# Patient Record
Sex: MALE | Race: WHITE | NOT HISPANIC OR LATINO | Employment: FULL TIME | URBAN - METROPOLITAN AREA
[De-identification: names, ages, dates, MRNs, and addresses within clinical notes are randomized per-mention and may not be internally consistent; named-entity substitution may affect disease eponyms.]

---

## 2017-05-11 DIAGNOSIS — N40.0 BENIGN NON-NODULAR PROSTATIC HYPERPLASIA WITHOUT LOWER URINARY TRACT SYMPTOMS: ICD-10-CM

## 2017-05-11 RX ORDER — FINASTERIDE 5 MG/1
5 TABLET, FILM COATED ORAL DAILY
Qty: 30 TABLET | Refills: 0 | Status: SHIPPED | OUTPATIENT
Start: 2017-05-11 | End: 2017-06-07 | Stop reason: SDUPTHER

## 2017-05-11 NOTE — TELEPHONE ENCOUNTER
----- Message from Osmani Amezquita sent at 5/11/2017  8:31 AM CDT -----  Contact: Louie  CHELY set up an annual appointment for June 9, 2017 @ 9:15 a. He is needing a prescription refill for   Rx finasteride (PROSCAR) 5 mg tablet 30 tablet 11     [id number] - [place], [place] - [address]  [address]  [place] [address]  Phone: [phone] Fax: [phone]    Please call him to let him know if he can get the fill before visit. Please call back [phone]. Thanks!

## 2017-06-07 DIAGNOSIS — N40.0 BENIGN NON-NODULAR PROSTATIC HYPERPLASIA WITHOUT LOWER URINARY TRACT SYMPTOMS: ICD-10-CM

## 2017-06-07 RX ORDER — FINASTERIDE 5 MG/1
TABLET, FILM COATED ORAL
Qty: 30 TABLET | Refills: 0 | Status: SHIPPED | OUTPATIENT
Start: 2017-06-07 | End: 2018-06-28 | Stop reason: SDUPTHER

## 2017-06-09 ENCOUNTER — OFFICE VISIT (OUTPATIENT)
Dept: UROLOGY | Facility: CLINIC | Age: 62
End: 2017-06-09
Payer: COMMERCIAL

## 2017-06-09 ENCOUNTER — LAB VISIT (OUTPATIENT)
Dept: LAB | Facility: HOSPITAL | Age: 62
End: 2017-06-09
Attending: UROLOGY
Payer: COMMERCIAL

## 2017-06-09 VITALS
SYSTOLIC BLOOD PRESSURE: 166 MMHG | DIASTOLIC BLOOD PRESSURE: 99 MMHG | HEIGHT: 70 IN | TEMPERATURE: 99 F | HEART RATE: 87 BPM

## 2017-06-09 DIAGNOSIS — N13.8 BPH WITH OBSTRUCTION/LOWER URINARY TRACT SYMPTOMS: ICD-10-CM

## 2017-06-09 DIAGNOSIS — N13.8 BPH WITH OBSTRUCTION/LOWER URINARY TRACT SYMPTOMS: Primary | ICD-10-CM

## 2017-06-09 DIAGNOSIS — N40.1 BPH WITH OBSTRUCTION/LOWER URINARY TRACT SYMPTOMS: ICD-10-CM

## 2017-06-09 DIAGNOSIS — N40.1 BPH WITH OBSTRUCTION/LOWER URINARY TRACT SYMPTOMS: Primary | ICD-10-CM

## 2017-06-09 LAB
BILIRUB SERPL-MCNC: NORMAL MG/DL
BLOOD URINE, POC: NORMAL
COLOR, POC UA: NORMAL
COMPLEXED PSA SERPL-MCNC: 2.4 NG/ML
GLUCOSE UR QL STRIP: NORMAL
KETONES UR QL STRIP: NORMAL
LEUKOCYTE ESTERASE URINE, POC: NORMAL
NITRITE, POC UA: NORMAL
PH, POC UA: 5
PROTEIN, POC: NORMAL
SPECIFIC GRAVITY, POC UA: 1.02
UROBILINOGEN, POC UA: NORMAL

## 2017-06-09 PROCEDURE — 99999 PR PBB SHADOW E&M-EST. PATIENT-LVL III: CPT | Mod: PBBFAC,,, | Performed by: UROLOGY

## 2017-06-09 PROCEDURE — 99214 OFFICE O/P EST MOD 30 MIN: CPT | Mod: 25,S$GLB,, | Performed by: UROLOGY

## 2017-06-09 PROCEDURE — 81002 URINALYSIS NONAUTO W/O SCOPE: CPT | Mod: S$GLB,,, | Performed by: UROLOGY

## 2017-06-09 PROCEDURE — 84153 ASSAY OF PSA TOTAL: CPT

## 2017-06-09 PROCEDURE — 36415 COLL VENOUS BLD VENIPUNCTURE: CPT

## 2017-06-09 RX ORDER — BIOTIN 5 MG
1 TABLET ORAL DAILY
COMMUNITY
End: 2019-10-18

## 2017-06-09 RX ORDER — PERPHENAZINE/AMITRIPTYLINE HCL 4 MG-25 MG
1 TABLET ORAL DAILY
COMMUNITY

## 2017-06-09 RX ORDER — ACETAMINOPHEN 500 MG
5000 TABLET ORAL DAILY
COMMUNITY
End: 2019-10-18

## 2017-06-09 RX ORDER — IBUPROFEN 100 MG/5ML
1000 SUSPENSION, ORAL (FINAL DOSE FORM) ORAL DAILY
COMMUNITY

## 2017-06-09 RX ORDER — FINASTERIDE 5 MG/1
5 TABLET, FILM COATED ORAL DAILY
Qty: 90 TABLET | Refills: 3 | Status: SHIPPED | OUTPATIENT
Start: 2017-06-09 | End: 2018-06-09

## 2017-06-09 RX ORDER — MULTIVIT WITH MINERALS/HERBS
1 TABLET ORAL DAILY
COMMUNITY
End: 2023-05-16 | Stop reason: ALTCHOICE

## 2017-06-09 NOTE — PROGRESS NOTES
Subjective:       Patient ID: Louie Estrella is a 62 y.o. male.    Chief Complaint:  OFFICE NOTE    CHIEF COMPLAINT:  BPH.    Mr. Estrella is a 62-year-old male, morbidly obese, who is here for his annual   prostate evaluation.  The patient has been treated for that condition with   Proscar daily.  It is to be noted than in 2014, he underwent a laser   vaporization of the prostate and since then he is urinating fairly satisfactory.    The patient referred to have occasional nocturia and mild urinary frequency   during the day.  The flow is adequate and steady.  Denies any dysuria and he   feels that he can empty the bladder satisfactorily.  Denies hematuria.    The family history is negative for prostate cancer and the past medical and   surgical history have not changed since the last visit to our office on February 2016.  The past medical history and the past surgical history, the current   medications and the allergies were fully reviewed during this visit.    It is to be noted that the patient was found to be hypertensive with a blood   pressure of 181/97 and it has been suggested to seek medical help with his   primary doctor.  He also is to be noted that the patient did not have a   colonoscopy in more than 15 years and I am referring him for ambulatory GI to do   his colonoscopy.    The urinalysis today is clear.  The last PSA was on 01/20/2016, 2.4.      EOR/PN  dd: 06/09/2017 12:39:22 (CDT)  td: 06/09/2017 16:31:40 (CDT)  Doc ID   #[id number]  Job ID #[id number]    CC:        HPI  Review of Systems   Constitutional: Negative for activity change and appetite change.   HENT: Negative.    Eyes: Negative for discharge.   Respiratory: Negative for cough and shortness of breath.    Cardiovascular: Negative for chest pain and palpitations.   Gastrointestinal: Negative for abdominal distention, abdominal pain, constipation and vomiting.   Genitourinary: Positive for frequency. Negative for discharge, dysuria, flank  pain, hematuria, testicular pain and urgency.   Musculoskeletal: Negative for arthralgias.   Skin: Negative for rash.   Neurological: Negative for dizziness.   Psychiatric/Behavioral: The patient is not nervous/anxious.        Objective:      Physical Exam   Constitutional: He appears well-developed and well-nourished.   HENT:   Head: Normocephalic.   Eyes: Pupils are equal, round, and reactive to light.   Neck: Normal range of motion.   Cardiovascular: Normal rate.    Pulmonary/Chest: Effort normal.   Abdominal: Soft. He exhibits no distension and no mass. There is no tenderness. Hernia confirmed negative in the right inguinal area and confirmed negative in the left inguinal area.   Genitourinary: Rectum normal and penis normal. Rectal exam shows no external hemorrhoid, no mass and no tenderness. Prostate is enlarged. Prostate is not tender. Right testis shows no mass and no tenderness. Left testis shows no mass and no tenderness. No discharge found.   Musculoskeletal: Normal range of motion.   Neurological: He is alert.   Skin: Skin is warm.     Psychiatric: He has a normal mood and affect.       Assessment:       1. BPH with obstruction/lower urinary tract symptoms        Plan:       BPH with obstruction/lower urinary tract symptoms  -     POCT URINE DIPSTICK WITHOUT MICROSCOPE  -     Prostate Specific Antigen, Diagnostic; Future; Expected date: 06/09/2017  -     Ambulatory consult to Gastroenterology    Other orders  -     finasteride (PROSCAR) 5 mg tablet; Take 1 tablet (5 mg total) by mouth once daily.  Dispense: 90 tablet; Refill: 3    Keep on finasteride. Colonoscopy and BP control by PCP.

## 2017-06-12 DIAGNOSIS — Z12.11 COLON CANCER SCREENING: Primary | ICD-10-CM

## 2017-06-21 ENCOUNTER — SURGERY (OUTPATIENT)
Age: 62
End: 2017-06-21

## 2017-06-21 ENCOUNTER — HOSPITAL ENCOUNTER (OUTPATIENT)
Facility: HOSPITAL | Age: 62
Discharge: HOME | End: 2017-06-21
Attending: INTERNAL MEDICINE | Admitting: INTERNAL MEDICINE
Payer: COMMERCIAL

## 2017-06-21 ENCOUNTER — ANESTHESIA (OUTPATIENT)
Dept: ENDOSCOPY | Facility: HOSPITAL | Age: 62
End: 2017-06-21
Payer: COMMERCIAL

## 2017-06-21 ENCOUNTER — ANESTHESIA EVENT (OUTPATIENT)
Dept: ENDOSCOPY | Facility: HOSPITAL | Age: 62
End: 2017-06-21
Payer: COMMERCIAL

## 2017-06-21 VITALS — RESPIRATION RATE: 14 BRPM

## 2017-06-21 DIAGNOSIS — Z12.11 SCREENING FOR COLON CANCER: ICD-10-CM

## 2017-06-21 PROCEDURE — 25000003 PHARM REV CODE 250: Performed by: NURSE ANESTHETIST, CERTIFIED REGISTERED

## 2017-06-21 PROCEDURE — D9220A PRA ANESTHESIA: Mod: 33,CRNA,,

## 2017-06-21 PROCEDURE — 25000003 PHARM REV CODE 250: Performed by: INTERNAL MEDICINE

## 2017-06-21 PROCEDURE — 45385 COLONOSCOPY W/LESION REMOVAL: CPT | Mod: 33,,, | Performed by: INTERNAL MEDICINE

## 2017-06-21 PROCEDURE — 37000009 HC ANESTHESIA EA ADD 15 MINS: Performed by: INTERNAL MEDICINE

## 2017-06-21 PROCEDURE — 63600175 PHARM REV CODE 636 W HCPCS: Performed by: NURSE ANESTHETIST, CERTIFIED REGISTERED

## 2017-06-21 PROCEDURE — 45385 COLONOSCOPY W/LESION REMOVAL: CPT | Performed by: INTERNAL MEDICINE

## 2017-06-21 PROCEDURE — 27201089 HC SNARE, DISP (ANY): Performed by: INTERNAL MEDICINE

## 2017-06-21 PROCEDURE — 88305 TISSUE EXAM BY PATHOLOGIST: CPT | Performed by: PATHOLOGY

## 2017-06-21 PROCEDURE — 37000008 HC ANESTHESIA 1ST 15 MINUTES: Performed by: INTERNAL MEDICINE

## 2017-06-21 PROCEDURE — D9220A PRA ANESTHESIA: Mod: 33,ANES,, | Performed by: ANESTHESIOLOGY

## 2017-06-21 RX ORDER — DEXTROMETHORPHAN/PSEUDOEPHED 2.5-7.5/.8
DROPS ORAL
Status: DISCONTINUED
Start: 2017-06-21 | End: 2017-06-21 | Stop reason: HOSPADM

## 2017-06-21 RX ORDER — LIDOCAINE HYDROCHLORIDE 10 MG/ML
INJECTION, SOLUTION INTRAVENOUS
Status: DISCONTINUED | OUTPATIENT
Start: 2017-06-21 | End: 2017-06-21

## 2017-06-21 RX ORDER — PROPOFOL 10 MG/ML
VIAL (ML) INTRAVENOUS
Status: DISCONTINUED | OUTPATIENT
Start: 2017-06-21 | End: 2017-06-21

## 2017-06-21 RX ORDER — SODIUM CHLORIDE 9 MG/ML
INJECTION, SOLUTION INTRAVENOUS CONTINUOUS
Status: DISCONTINUED | OUTPATIENT
Start: 2017-06-21 | End: 2017-06-21 | Stop reason: HOSPADM

## 2017-06-21 RX ADMIN — PROPOFOL 50 MG: 10 INJECTION, EMULSION INTRAVENOUS at 08:06

## 2017-06-21 RX ADMIN — PROPOFOL 100 MG: 10 INJECTION, EMULSION INTRAVENOUS at 08:06

## 2017-06-21 RX ADMIN — SODIUM CHLORIDE 1000 ML: 0.9 INJECTION, SOLUTION INTRAVENOUS at 07:06

## 2017-06-21 RX ADMIN — LIDOCAINE HYDROCHLORIDE 50 MG: 10 INJECTION, SOLUTION INTRAVENOUS at 08:06

## 2017-06-21 NOTE — DISCHARGE INSTRUCTIONS

## 2017-06-21 NOTE — H&P
"[facility] Note    CC: screening colonoscopy    HPI 62 y.o. male presents for average risk screening colonoscopy.      Past Medical History:   Diagnosis Date    Allergy     BPH (benign prostatic hypertrophy)     Encounter for blood transfusion     Hypertension     Wears glasses          Review of Systems  General ROS: negative for - chills, fever or weight loss    Physical Examination  BP (!) 178/95 (BP Location: Left arm, Patient Position: Lying, BP Method: Automatic)   Pulse 81   Temp 98.6 °F (37 °C) (Oral)   Resp 20   Ht 5' 10" (1.778 m)   Wt 133.8 kg (295 lb)   SpO2 96%   BMI 42.33 kg/m²   General appearance: alert, cooperative, no distress  HENT: Normocephalic, atraumatic, neck symmetrical, no nasal discharge   Abdomen: soft, non tender, non distended BS present  Extremities: extremities symmetric; no clubbing, cyanosis, or edema    Assessment:   62 y.o. male presents for average risk screening colonoscopy.    Plan:  Screening colonoscopy today    Tim Rush MD  [facility]  [address] [place], [address]  [place] [address]  Office: [phone]  Fax: [phone]    "

## 2017-06-21 NOTE — ANESTHESIA POSTPROCEDURE EVALUATION
"Anesthesia Post Evaluation    Patient: Louie Estrella    Procedure(s) Performed: Procedure(s) (LRB):  COLONOSCOPY (N/A)    Final Anesthesia Type: general  Patient location during evaluation: PACU  Patient participation: Yes- Able to Participate  Level of consciousness: awake and alert  Post-procedure vital signs: reviewed and stable  Pain management: adequate  Airway patency: patent  PONV status at discharge: No PONV  Anesthetic complications: no      Cardiovascular status: blood pressure returned to baseline  Respiratory status: unassisted  Hydration status: euvolemic  Follow-up not needed.        Visit Vitals  BP (!) 185/98   Pulse 80   Temp 37 °C (98.6 °F) (Oral)   Resp 20   Ht 5' 10" (1.778 m)   Wt 133.8 kg (295 lb)   SpO2 98%   BMI 42.33 kg/m²       Pain/Lauren Score: Presence of Pain: denies (6/21/2017  8:40 AM)  Lauren Score: 10 (6/21/2017  9:00 AM)      "

## 2017-06-21 NOTE — TRANSFER OF CARE
"Anesthesia Transfer of Care Note    Patient: Louie Estrella    Procedure(s) Performed: Procedure(s) (LRB):  COLONOSCOPY (N/A)    Patient location: PACU    Anesthesia Type: general    Transport from OR: Transported from OR on room air with adequate spontaneous ventilation    Post pain: adequate analgesia    Post assessment: no apparent anesthetic complications    Post vital signs: stable    Level of consciousness: awake    Nausea/Vomiting: no nausea/vomiting    Complications: none    Transfer of care protocol was followed      Last vitals:   Visit Vitals  BP (!) 178/95 (BP Location: Left arm, Patient Position: Lying, BP Method: Automatic)   Pulse 81   Temp 37 °C (98.6 °F) (Oral)   Resp 20   Ht 5' 10" (1.778 m)   Wt 133.8 kg (295 lb)   SpO2 96%   BMI 42.33 kg/m²     "

## 2017-06-21 NOTE — OR NURSING
Have you had a colonoscopy LESS THAN 3 years ago?   * If YES, answer these questions*:no 1993    1. Did patient have a prior colonic polyp in a previous surveillance/diagnostic colonoscopy and is 18 years or older on date of encounter?    2. Documentation of < 3 year interval since the patients last colonoscopy due to medical reasons (eg., last colonoscopy incomplete, last colonoscopy had inadequate prep, piecemeal removal of adenomas, or last colonoscopy found > 10 adenomas) ?

## 2017-06-21 NOTE — ANESTHESIA PREPROCEDURE EVALUATION
06/21/2017  Louie Estrella is a 62 y.o., male.    Anesthesia Evaluation    I have reviewed the Patient Summary Reports.    I have reviewed the Nursing Notes.   I have reviewed the Medications.     Review of Systems  Anesthesia Hx:  Denies Family Hx of Anesthesia complications.   Denies Personal Hx of Anesthesia complications.   Cardiovascular:   Hypertension    Pulmonary:  Pulmonary Normal    Renal/:  Renal/ Normal     Hepatic/GI:   Bowel Prep.    Neurological:  Neurology Normal    Endocrine:  Endocrine Normal        Physical Exam  General:  Obesity    Airway/Jaw/Neck:  Airway Findings: Mouth Opening: Normal Tongue: Normal  General Airway Assessment: Adult  Mallampati: III  TM Distance: Normal, at least 6 cm  Jaw/Neck Findings:  Neck ROM: Normal ROM  Neck Findings:  Girth Increased      Dental:  Dental Findings: In tact   Chest/Lungs:  Chest/Lungs Clear    Heart/Vascular:  Heart Findings: Normal            Anesthesia Plan  Type of Anesthesia, risks & benefits discussed:  Anesthesia Type:  general  Patient's Preference:   Intra-op Monitoring Plan: standard ASA monitors  Intra-op Monitoring Plan Comments:   Post Op Pain Control Plan:   Post Op Pain Control Plan Comments:   Induction:   IV  Beta Blocker:  Patient is not currently on a Beta-Blocker (No further documentation required).       Informed Consent: Patient understands risks and agrees with Anesthesia plan.  Questions answered. Anesthesia consent signed with patient.  ASA Score: 2     Day of Surgery Review of History & Physical:    H&P update referred to the provider.         Ready For Surgery From Anesthesia Perspective.

## 2017-06-22 VITALS
WEIGHT: 295 LBS | HEART RATE: 80 BPM | DIASTOLIC BLOOD PRESSURE: 98 MMHG | RESPIRATION RATE: 20 BRPM | SYSTOLIC BLOOD PRESSURE: 185 MMHG | BODY MASS INDEX: 42.23 KG/M2 | OXYGEN SATURATION: 98 % | TEMPERATURE: 99 F | HEIGHT: 70 IN

## 2017-06-27 ENCOUNTER — PATIENT MESSAGE (OUTPATIENT)
Dept: GASTROENTEROLOGY | Facility: CLINIC | Age: 62
End: 2017-06-27

## 2018-06-28 DIAGNOSIS — N40.0 BENIGN NON-NODULAR PROSTATIC HYPERPLASIA WITHOUT LOWER URINARY TRACT SYMPTOMS: ICD-10-CM

## 2018-07-02 RX ORDER — FINASTERIDE 5 MG/1
5 TABLET, FILM COATED ORAL DAILY
Qty: 90 TABLET | Refills: 0 | Status: SHIPPED | OUTPATIENT
Start: 2018-07-02 | End: 2018-09-19 | Stop reason: SDUPTHER

## 2018-09-19 ENCOUNTER — LAB VISIT (OUTPATIENT)
Dept: LAB | Facility: HOSPITAL | Age: 63
End: 2018-09-19
Attending: UROLOGY
Payer: COMMERCIAL

## 2018-09-19 ENCOUNTER — OFFICE VISIT (OUTPATIENT)
Dept: UROLOGY | Facility: CLINIC | Age: 63
End: 2018-09-19
Payer: COMMERCIAL

## 2018-09-19 VITALS
DIASTOLIC BLOOD PRESSURE: 78 MMHG | HEIGHT: 69 IN | HEART RATE: 97 BPM | SYSTOLIC BLOOD PRESSURE: 138 MMHG | BODY MASS INDEX: 42.65 KG/M2 | WEIGHT: 288 LBS

## 2018-09-19 DIAGNOSIS — N40.1 BPH WITH OBSTRUCTION/LOWER URINARY TRACT SYMPTOMS: Primary | ICD-10-CM

## 2018-09-19 DIAGNOSIS — N40.0 BENIGN NON-NODULAR PROSTATIC HYPERPLASIA WITHOUT LOWER URINARY TRACT SYMPTOMS: ICD-10-CM

## 2018-09-19 DIAGNOSIS — N13.8 BPH WITH OBSTRUCTION/LOWER URINARY TRACT SYMPTOMS: ICD-10-CM

## 2018-09-19 DIAGNOSIS — N13.8 BPH WITH OBSTRUCTION/LOWER URINARY TRACT SYMPTOMS: Primary | ICD-10-CM

## 2018-09-19 DIAGNOSIS — N40.1 BPH WITH OBSTRUCTION/LOWER URINARY TRACT SYMPTOMS: ICD-10-CM

## 2018-09-19 LAB — COMPLEXED PSA SERPL-MCNC: 2.4 NG/ML

## 2018-09-19 PROCEDURE — 81002 URINALYSIS NONAUTO W/O SCOPE: CPT | Mod: S$GLB,ICN,, | Performed by: UROLOGY

## 2018-09-19 PROCEDURE — 84153 ASSAY OF PSA TOTAL: CPT

## 2018-09-19 PROCEDURE — 3008F BODY MASS INDEX DOCD: CPT | Mod: S$GLB,ICN,, | Performed by: UROLOGY

## 2018-09-19 PROCEDURE — 99999 PR PBB SHADOW E&M-EST. PATIENT-LVL III: CPT | Mod: PBBFAC,,, | Performed by: UROLOGY

## 2018-09-19 PROCEDURE — 36415 COLL VENOUS BLD VENIPUNCTURE: CPT

## 2018-09-19 PROCEDURE — 99214 OFFICE O/P EST MOD 30 MIN: CPT | Mod: 25,S$GLB,ICN, | Performed by: UROLOGY

## 2018-09-19 RX ORDER — FINASTERIDE 5 MG/1
5 TABLET, FILM COATED ORAL DAILY
Qty: 90 TABLET | Refills: 3 | Status: SHIPPED | OUTPATIENT
Start: 2018-09-19 | End: 2019-10-11 | Stop reason: SDUPTHER

## 2018-09-19 RX ORDER — ROSUVASTATIN CALCIUM 10 MG/1
20 TABLET, COATED ORAL DAILY
COMMUNITY
End: 2019-10-18

## 2018-09-19 RX ORDER — LISINOPRIL AND HYDROCHLOROTHIAZIDE 10; 12.5 MG/1; MG/1
1 TABLET ORAL DAILY
COMMUNITY
End: 2021-08-03

## 2018-09-19 RX ORDER — METFORMIN HYDROCHLORIDE EXTENDED-RELEASE TABLETS 500 MG/1
500 TABLET, FILM COATED, EXTENDED RELEASE ORAL
COMMUNITY
End: 2021-08-03

## 2018-09-19 NOTE — PROGRESS NOTES
Subjective:       Patient ID: Louie Estrella is a 63 y.o. male.    Chief Complaint:   DATE OF VISIT:  09/19/2018.    CHIEF COMPLAINT:  Symptomatic BPH.    HISTORY OF PRESENT ILLNESS:  Mr. Estrella is a 63-year-old male, morbid  obese, who is here for his annual prostate evaluation.  The patient has  been treated for that condition with Proscar daily.    In 2014, he underwent a laser vaporization of the prostate and since then  his lower urinary tract symptoms have significantly improved.  The patient  at the present time has no nocturia, dysuria or hematuria.  The flow is  adequate and he feels like he empties the bladder satisfactory.    The past medical and surgical history, the current medications and the  allergies are well documented in the medical record and all these were  reviewed by me during this visit.    The last PSA was on 06/2017, 2.4.  The urinalysis today is clear.        EOR/HN dd: 09/19/2018 16:10:24 (CDT)   td: 09/19/2018 22:15:57 (CDT)  Doc ID #[id number]   Job ID #[id number]    CC:            HPI  Review of Systems   Constitutional: Negative for activity change and appetite change.   HENT: Negative.    Eyes: Negative for discharge.   Respiratory: Negative for cough and shortness of breath.    Cardiovascular: Negative for chest pain and palpitations.   Gastrointestinal: Negative for abdominal distention, abdominal pain, constipation and vomiting.   Genitourinary: Negative for discharge, dysuria, flank pain, frequency, hematuria, testicular pain and urgency.   Musculoskeletal: Negative for arthralgias.   Skin: Negative for rash.   Neurological: Negative for dizziness.   Psychiatric/Behavioral: The patient is not nervous/anxious.        Objective:      Physical Exam   Constitutional: He appears well-developed and well-nourished.   HENT:   Head: Normocephalic.   Eyes: Pupils are equal, round, and reactive to light.   Neck: Normal range of motion.   Cardiovascular: Normal rate.    Pulmonary/Chest: Effort  normal.   Abdominal: Soft. He exhibits no distension and no mass. There is no tenderness.   Genitourinary: Rectum normal, prostate normal and penis normal. Rectal exam shows no external hemorrhoid, no mass and no tenderness. Prostate is not enlarged and not tender. Right testis shows no mass and no tenderness. Left testis shows no mass and no tenderness. No discharge found.   Musculoskeletal: Normal range of motion.   Neurological: He is alert.   Skin: Skin is warm.     Psychiatric: He has a normal mood and affect.       Assessment:     BPH with LUTS      Plan:       BPH with obstruction/lower urinary tract symptoms  -     POCT URINE DIPSTICK WITHOUT MICROSCOPE  -     Prostate Specific Antigen, Diagnostic; Future; Expected date: 09/19/2018    Benign non-nodular prostatic hyperplasia without lower urinary tract symptoms  -     finasteride (PROSCAR) 5 mg tablet; Take 1 tablet (5 mg total) by mouth once daily.  Dispense: 90 tablet; Refill: 3    Stable. RTC 1 yr. Plan to keep same management.

## 2018-09-20 LAB
BILIRUB SERPL-MCNC: NEGATIVE MG/DL
BLOOD URINE, POC: NEGATIVE
COLOR, POC UA: NORMAL
GLUCOSE UR QL STRIP: NEGATIVE
KETONES UR QL STRIP: NEGATIVE
LEUKOCYTE ESTERASE URINE, POC: NEGATIVE
NITRITE, POC UA: NEGATIVE
PH, POC UA: 6
PROTEIN, POC: NEGATIVE
SPECIFIC GRAVITY, POC UA: 1005
UROBILINOGEN, POC UA: NEGATIVE

## 2019-03-19 ENCOUNTER — OCCUPATIONAL HEALTH (OUTPATIENT)
Dept: URGENT CARE | Facility: CLINIC | Age: 64
End: 2019-03-19

## 2019-03-19 PROCEDURE — 80305 PR NON-DOT DRUG SCREENS: ICD-10-PCS | Mod: S$GLB,,, | Performed by: EMERGENCY MEDICINE

## 2019-03-19 PROCEDURE — 82075 CHG ASSAY OF BREATH ETHANOL: ICD-10-PCS | Mod: S$GLB,,, | Performed by: EMERGENCY MEDICINE

## 2019-03-19 PROCEDURE — 82075 ASSAY OF BREATH ETHANOL: CPT | Mod: S$GLB,,, | Performed by: EMERGENCY MEDICINE

## 2019-03-19 PROCEDURE — 80305 DRUG TEST PRSMV DIR OPT OBS: CPT | Mod: S$GLB,,, | Performed by: EMERGENCY MEDICINE

## 2019-10-11 DIAGNOSIS — N40.0 BENIGN NON-NODULAR PROSTATIC HYPERPLASIA WITHOUT LOWER URINARY TRACT SYMPTOMS: ICD-10-CM

## 2019-10-14 RX ORDER — FINASTERIDE 5 MG/1
TABLET, FILM COATED ORAL
Qty: 30 TABLET | Refills: 11 | Status: SHIPPED | OUTPATIENT
Start: 2019-10-14 | End: 2020-10-02

## 2019-10-18 ENCOUNTER — OFFICE VISIT (OUTPATIENT)
Dept: UROLOGY | Facility: CLINIC | Age: 64
End: 2019-10-18
Payer: COMMERCIAL

## 2019-10-18 VITALS
WEIGHT: 302 LBS | BODY MASS INDEX: 44.73 KG/M2 | RESPIRATION RATE: 16 BRPM | TEMPERATURE: 98 F | OXYGEN SATURATION: 97 % | SYSTOLIC BLOOD PRESSURE: 148 MMHG | HEART RATE: 90 BPM | DIASTOLIC BLOOD PRESSURE: 84 MMHG | HEIGHT: 69 IN

## 2019-10-18 DIAGNOSIS — N13.8 BENIGN PROSTATIC HYPERPLASIA WITH URINARY OBSTRUCTION: Primary | ICD-10-CM

## 2019-10-18 DIAGNOSIS — N40.1 BENIGN PROSTATIC HYPERPLASIA WITH URINARY OBSTRUCTION: Primary | ICD-10-CM

## 2019-10-18 LAB
BILIRUB SERPL-MCNC: NORMAL MG/DL
BLOOD URINE, POC: NORMAL
COLOR, POC UA: NORMAL
GLUCOSE UR QL STRIP: NORMAL
KETONES UR QL STRIP: NORMAL
LEUKOCYTE ESTERASE URINE, POC: NORMAL
NITRITE, POC UA: NORMAL
PH, POC UA: 5
PROTEIN, POC: NORMAL
SPECIFIC GRAVITY, POC UA: 1.01
UROBILINOGEN, POC UA: NORMAL

## 2019-10-18 PROCEDURE — 81002 POCT URINE DIPSTICK WITHOUT MICROSCOPE: ICD-10-PCS | Mod: S$GLB,,, | Performed by: UROLOGY

## 2019-10-18 PROCEDURE — 3008F BODY MASS INDEX DOCD: CPT | Mod: S$GLB,,, | Performed by: UROLOGY

## 2019-10-18 PROCEDURE — 99214 PR OFFICE/OUTPT VISIT, EST, LEVL IV, 30-39 MIN: ICD-10-PCS | Mod: 25,S$GLB,, | Performed by: UROLOGY

## 2019-10-18 PROCEDURE — 99214 OFFICE O/P EST MOD 30 MIN: CPT | Mod: 25,S$GLB,, | Performed by: UROLOGY

## 2019-10-18 PROCEDURE — 99999 PR PBB SHADOW E&M-EST. PATIENT-LVL III: CPT | Mod: PBBFAC,,, | Performed by: UROLOGY

## 2019-10-18 PROCEDURE — 81002 URINALYSIS NONAUTO W/O SCOPE: CPT | Mod: S$GLB,,, | Performed by: UROLOGY

## 2019-10-18 PROCEDURE — 99999 PR PBB SHADOW E&M-EST. PATIENT-LVL III: ICD-10-PCS | Mod: PBBFAC,,, | Performed by: UROLOGY

## 2019-10-18 PROCEDURE — 3008F PR BODY MASS INDEX (BMI) DOCUMENTED: ICD-10-PCS | Mod: S$GLB,,, | Performed by: UROLOGY

## 2019-10-18 RX ORDER — IBUPROFEN 100 MG/5ML
SUSPENSION, ORAL (FINAL DOSE FORM) ORAL
COMMUNITY
End: 2019-10-18

## 2019-10-18 RX ORDER — ROSUVASTATIN CALCIUM 20 MG/1
TABLET, COATED ORAL
Refills: 5 | COMMUNITY
Start: 2019-08-12 | End: 2019-10-18

## 2019-10-18 RX ORDER — ROSUVASTATIN CALCIUM 20 MG/1
TABLET, COATED ORAL
COMMUNITY

## 2019-10-18 RX ORDER — ACETAMINOPHEN 500 MG
TABLET ORAL
COMMUNITY

## 2019-10-18 RX ORDER — LISINOPRIL AND HYDROCHLOROTHIAZIDE 12.5; 2 MG/1; MG/1
TABLET ORAL
COMMUNITY
End: 2019-10-18

## 2019-10-18 RX ORDER — EZETIMIBE 10 MG/1
10 TABLET ORAL DAILY
Refills: 1 | COMMUNITY
Start: 2019-09-26 | End: 2019-10-18

## 2019-10-18 RX ORDER — METFORMIN HYDROCHLORIDE 500 MG/1
TABLET ORAL
Refills: 1 | COMMUNITY
Start: 2019-09-14 | End: 2019-10-18

## 2019-10-18 RX ORDER — LEVOCETIRIZINE DIHYDROCHLORIDE 5 MG/1
TABLET, FILM COATED ORAL
COMMUNITY
End: 2019-10-18

## 2019-10-18 RX ORDER — METFORMIN HYDROCHLORIDE 500 MG/1
TABLET ORAL
COMMUNITY
End: 2019-10-18

## 2019-10-18 RX ORDER — EZETIMIBE 10 MG/1
TABLET ORAL
COMMUNITY

## 2019-10-18 RX ORDER — LISINOPRIL AND HYDROCHLOROTHIAZIDE 12.5; 2 MG/1; MG/1
1 TABLET ORAL DAILY
Refills: 1 | COMMUNITY
Start: 2019-08-12 | End: 2019-10-18

## 2019-10-18 RX ORDER — PERPHENAZINE/AMITRIPTYLINE HCL 4 MG-25 MG
TABLET ORAL
COMMUNITY
End: 2019-10-18

## 2019-10-18 RX ORDER — FINASTERIDE 5 MG/1
TABLET, FILM COATED ORAL
COMMUNITY
End: 2019-10-18

## 2019-10-18 RX ORDER — BIOTIN 1000 MCG
TABLET,CHEWABLE ORAL
COMMUNITY
End: 2023-05-16 | Stop reason: ALTCHOICE

## 2019-10-18 NOTE — PROGRESS NOTES
Subjective:       Patient ID: Louie Estrella is a 64 y.o. male.    Chief Complaint:   DATE OF VISIT:  10/18/2019.    CHIEF COMPLAINT:  Symptomatic BPH.    HISTORY OF PRESENT ILLNESS:  Mr. Estrella is a 64-year-old male who in 2014  underwent a laser vaporization of the prostate.  Since then, his lower  urinary tract symptoms have significantly improved and he is here for his  annual prostate evaluation.  At the present time, she has no nocturia or  x1.  No dysuria or hematuria.  The flow is adequate and he feels that he  empties the bladder satisfactorily.    FAMILY HISTORY:  Negative for prostate cancer, but his father suffered of  BPH.  His father already passed away.    The past medical and surgical history are well documented in the medical  record and all this was reviewed by me during this visit.  This patient is  a morbid obese patient with a weight of 302 pounds.  He is diabetic type 2.    The last PSA was on 09/2018, 2.4.  The urinalysis today is all within  normal limits.        EOR/HN dd: 10/18/2019 12:07:54 (CDT)   td: 10/18/2019 18:31:19 (CDT)  Doc ID #[id number]   Job ID #[id number]    CC:            HPI  Review of Systems   Constitutional: Negative for activity change and appetite change.   HENT: Negative.    Eyes: Negative for discharge.   Respiratory: Negative for cough and shortness of breath.    Cardiovascular: Negative for chest pain and palpitations.   Gastrointestinal: Negative for abdominal distention, abdominal pain, constipation and vomiting.   Genitourinary: Negative for discharge, dysuria, flank pain, frequency, hematuria, testicular pain and urgency.   Musculoskeletal: Negative for arthralgias.   Skin: Negative for rash.   Neurological: Negative for dizziness.   Psychiatric/Behavioral: The patient is not nervous/anxious.        Objective:      Physical Exam   Constitutional: He appears well-developed and well-nourished.   HENT:   Head: Normocephalic.   Eyes: Pupils are equal, round, and  reactive to light.   Neck: Normal range of motion.   Cardiovascular: Normal rate.    Pulmonary/Chest: Effort normal.   Abdominal: Soft. He exhibits no distension and no mass. There is no tenderness.   Genitourinary: Rectum normal, prostate normal and penis normal. Rectal exam shows no external hemorrhoid, no mass and no tenderness. Prostate is not enlarged and not tender. Right testis shows no mass and no tenderness. Left testis shows no mass and no tenderness. No discharge found.   Musculoskeletal: Normal range of motion.   Neurological: He is alert.   Skin: Skin is warm.     Psychiatric: He has a normal mood and affect.       Assessment:       1. BPH (benign prostatic hypertrophy) with urinary obstruction        Plan:       BPH (benign prostatic hypertrophy) with urinary obstruction  -     Prostate Specific Antigen, Diagnostic; Future; Expected date: 10/18/2019    Stable with good results after 5 yrs of PVP. Patient to be informed of PSA results. RTC 1 yr

## 2019-10-26 LAB
COPY(IES) SENT TO:: NORMAL
PSA SERPL-MCNC: 1.5 NG/ML

## 2020-12-15 ENCOUNTER — PATIENT MESSAGE (OUTPATIENT)
Dept: UROLOGY | Facility: CLINIC | Age: 65
End: 2020-12-15

## 2020-12-23 ENCOUNTER — OFFICE VISIT (OUTPATIENT)
Dept: UROLOGY | Facility: CLINIC | Age: 65
End: 2020-12-23
Payer: MEDICARE

## 2020-12-23 VITALS
WEIGHT: 299 LBS | DIASTOLIC BLOOD PRESSURE: 79 MMHG | BODY MASS INDEX: 44.28 KG/M2 | HEART RATE: 115 BPM | TEMPERATURE: 98 F | HEIGHT: 69 IN | OXYGEN SATURATION: 96 % | SYSTOLIC BLOOD PRESSURE: 150 MMHG | RESPIRATION RATE: 16 BRPM

## 2020-12-23 DIAGNOSIS — N13.8 BPH WITH OBSTRUCTION/LOWER URINARY TRACT SYMPTOMS: Primary | ICD-10-CM

## 2020-12-23 DIAGNOSIS — N40.0 BENIGN NON-NODULAR PROSTATIC HYPERPLASIA WITHOUT LOWER URINARY TRACT SYMPTOMS: ICD-10-CM

## 2020-12-23 DIAGNOSIS — N40.1 BPH WITH OBSTRUCTION/LOWER URINARY TRACT SYMPTOMS: Primary | ICD-10-CM

## 2020-12-23 LAB
BILIRUB SERPL-MCNC: NORMAL MG/DL
BLOOD URINE, POC: NORMAL
CLARITY, POC UA: CLEAR
COLOR, POC UA: YELLOW
GLUCOSE UR QL STRIP: NORMAL
KETONES UR QL STRIP: NORMAL
LEUKOCYTE ESTERASE URINE, POC: NORMAL
NITRITE, POC UA: NORMAL
PH, POC UA: 5.5
PROTEIN, POC: NORMAL
SPECIFIC GRAVITY, POC UA: 1.02
UROBILINOGEN, POC UA: 0.2

## 2020-12-23 PROCEDURE — 99214 PR OFFICE/OUTPT VISIT, EST, LEVL IV, 30-39 MIN: ICD-10-PCS | Mod: 25,S$GLB,, | Performed by: UROLOGY

## 2020-12-23 PROCEDURE — 81002 POCT URINE DIPSTICK WITHOUT MICROSCOPE: ICD-10-PCS | Mod: S$GLB,,, | Performed by: UROLOGY

## 2020-12-23 PROCEDURE — 99214 OFFICE O/P EST MOD 30 MIN: CPT | Mod: 25,S$GLB,, | Performed by: UROLOGY

## 2020-12-23 PROCEDURE — 81002 URINALYSIS NONAUTO W/O SCOPE: CPT | Mod: S$GLB,,, | Performed by: UROLOGY

## 2020-12-23 PROCEDURE — 99999 PR PBB SHADOW E&M-EST. PATIENT-LVL V: CPT | Mod: PBBFAC,,, | Performed by: UROLOGY

## 2020-12-23 PROCEDURE — 99999 PR PBB SHADOW E&M-EST. PATIENT-LVL V: ICD-10-PCS | Mod: PBBFAC,,, | Performed by: UROLOGY

## 2020-12-23 RX ORDER — FINASTERIDE 5 MG/1
5 TABLET, FILM COATED ORAL DAILY
Qty: 90 TABLET | Refills: 3 | Status: SHIPPED | OUTPATIENT
Start: 2020-12-23 | End: 2022-01-11

## 2020-12-23 NOTE — PROGRESS NOTES
Subjective:       Patient ID: Louie Estrella is a 65 y.o. male.    Chief Complaint:   Symptomatic BPH.    HPI   Mr. Estrella is a 65-year-old male who in 2014 have significant lower urinary tract symptoms and underwent a laser vaporization of the prostate.  Since then his lower urinary tract symptoms have significantly improved on is here for his annual prostate evaluation.  At the present time she he has no nocturia no dysuria.  Denies hematuria.  The flow is adequate and he feels that he can empty the bladder satisfactory.  The family history is negative for prostate cancer.  The past medical and surgical history are well documented in the electronic health record and all these were reviewed by me during this visit.  There is no changes since the last visit to our office on 10/2019.    At the present time the only  medication that the patient takes is finasteride 5 mg daily.  The last PSA was on October 2019 1.5.  The urinalysis today is clear.    Review of Systems   Constitutional: Negative for activity change and appetite change.   HENT: Negative.    Eyes: Negative for discharge.   Respiratory: Negative for cough and shortness of breath.    Cardiovascular: Negative for chest pain and palpitations.   Gastrointestinal: Negative for abdominal distention, abdominal pain, constipation and vomiting.   Genitourinary: Negative for discharge, dysuria, flank pain, frequency, hematuria, testicular pain and urgency.   Musculoskeletal: Negative for arthralgias.   Skin: Negative for rash.   Neurological: Negative for dizziness.   Psychiatric/Behavioral: The patient is not nervous/anxious.          Objective:      Physical Exam  Constitutional:       Appearance: He is well-developed.   HENT:      Head: Normocephalic.   Eyes:      Pupils: Pupils are equal, round, and reactive to light.   Neck:      Musculoskeletal: Normal range of motion.   Cardiovascular:      Rate and Rhythm: Normal rate.   Pulmonary:      Effort:  Pulmonary effort is normal.   Abdominal:      General: There is no distension.      Palpations: Abdomen is soft. There is no mass.      Tenderness: There is no abdominal tenderness.   Genitourinary:     Penis: Normal. No discharge.       Scrotum/Testes:         Right: Mass or tenderness not present.         Left: Mass or tenderness not present.      Prostate: Normal. Not enlarged and not tender.      Rectum: Normal. No mass, tenderness or external hemorrhoid.   Musculoskeletal: Normal range of motion.   Skin:     General: Skin is warm.   Neurological:      Mental Status: He is alert.         Assessment:       1. BPH with obstruction/lower urinary tract symptoms    2. Benign non-nodular prostatic hyperplasia without lower urinary tract symptoms        Plan:       BPH with obstruction/lower urinary tract symptoms  -     Prostate Specific Antigen, Diagnostic; Future; Expected date: 01/08/2021    Benign non-nodular prostatic hyperplasia without lower urinary tract symptoms  -     finasteride (PROSCAR) 5 mg tablet; Take 1 tablet (5 mg total) by mouth once daily.  Dispense: 90 tablet; Refill: 3      A PSA has been order and will be done at [facility] sometime in January.  The patient is going to keep taking finasteride daily.  He will have a follow-up appointment in 1 year.

## 2020-12-31 ENCOUNTER — OFFICE VISIT (OUTPATIENT)
Dept: URGENT CARE | Facility: CLINIC | Age: 65
End: 2020-12-31
Payer: COMMERCIAL

## 2020-12-31 VITALS
DIASTOLIC BLOOD PRESSURE: 76 MMHG | SYSTOLIC BLOOD PRESSURE: 133 MMHG | OXYGEN SATURATION: 89 % | RESPIRATION RATE: 15 BRPM | HEART RATE: 96 BPM | WEIGHT: 295 LBS | BODY MASS INDEX: 43.69 KG/M2 | TEMPERATURE: 98 F | HEIGHT: 69 IN

## 2020-12-31 DIAGNOSIS — R06.02 SHORTNESS OF BREATH: ICD-10-CM

## 2020-12-31 DIAGNOSIS — R09.02 HYPOXIA: Primary | ICD-10-CM

## 2020-12-31 DIAGNOSIS — R05.9 COUGH: ICD-10-CM

## 2020-12-31 PROCEDURE — 99213 PR OFFICE/OUTPT VISIT, EST, LEVL III, 20-29 MIN: ICD-10-PCS | Mod: S$GLB,,, | Performed by: NURSE PRACTITIONER

## 2020-12-31 PROCEDURE — 99213 OFFICE O/P EST LOW 20 MIN: CPT | Mod: S$GLB,,, | Performed by: NURSE PRACTITIONER

## 2020-12-31 RX ORDER — ALBUTEROL SULFATE 0.63 MG/3ML
0.63 SOLUTION RESPIRATORY (INHALATION)
Status: SHIPPED | OUTPATIENT
Start: 2020-12-31

## 2020-12-31 NOTE — PROGRESS NOTES
"Subjective: pt is complaining of chills, cough, chest congestion, diarrhea, shortness of breath, lack of appetite, and change of taste x 10 days. No known Covid Exposure       Patient ID: Louie Estrella is a 65 y.o. male.    Vitals:  height is 5' 9" (1.753 m) and weight is 133.8 kg (295 lb). His temporal temperature is 97.8 °F (36.6 °C). His blood pressure is 133/76 and his pulse is 96. His respiration is 15 and oxygen saturation is 89% (abnormal).     Chief Complaint: Cough (x 10 days , productive (coughing up mucus)), Chest Congestion (x 10 days ), Diarrhea (x 3 days ), Shortness of Breath (x 3 days), Chills (x 3 days ), Anorexia (x 3 days ), and COVID-19 Concerns (change in taste x 3 days)    Symptom onset: 10 days ago.    Cough  The cough is productive of sputum. Associated symptoms include chills, headaches, myalgias and shortness of breath. Pertinent negatives include no chest pain, rash or wheezing.   Diarrhea   Associated symptoms include chills, coughing, headaches and myalgias. Pertinent negatives include no abdominal pain.   Shortness of Breath  Associated symptoms include headaches. Pertinent negatives include no abdominal pain, chest pain, rash or wheezing.       Constitution: Positive for appetite change, chills, sweating and generalized weakness.   Neck: Negative for painful lymph nodes.   Cardiovascular: Negative for chest pain.   Respiratory: Positive for chest tightness, cough and shortness of breath. Negative for COPD, wheezing and asthma.    Gastrointestinal: Positive for diarrhea. Negative for abdominal pain and nausea.   Musculoskeletal: Positive for muscle ache.   Skin: Negative for pale and rash.   Allergic/Immunologic: Negative for asthma.   Neurological: Positive for headaches. Negative for altered mental status.   Hematologic/Lymphatic: Negative for swollen lymph nodes.   Psychiatric/Behavioral: Negative for altered mental status.       Objective:      Physical Exam   Constitutional: He " appears well-developed. He is cooperative.  Non-toxic appearance. He does not appear ill. No distress.   HENT:   Head: Normocephalic and atraumatic.   Ears:   Right Ear: Hearing, tympanic membrane, external ear and ear canal normal.   Left Ear: Hearing, tympanic membrane, external ear and ear canal normal.   Nose: Nose normal. No mucosal edema, rhinorrhea or nasal deformity. No epistaxis. Right sinus exhibits no maxillary sinus tenderness and no frontal sinus tenderness. Left sinus exhibits no maxillary sinus tenderness and no frontal sinus tenderness.   Mouth/Throat: Uvula is midline, oropharynx is clear and moist and mucous membranes are normal. No trismus in the jaw. Normal dentition. No uvula swelling. No oropharyngeal exudate, posterior oropharyngeal edema or posterior oropharyngeal erythema.   Eyes: Conjunctivae and lids are normal.   Neck: Full passive range of motion without pain and phonation normal. Neck supple. No neck rigidity. No edema and no erythema present.   Cardiovascular: Normal rate, regular rhythm, normal heart sounds and normal pulses.   Pulmonary/Chest: Breath sounds normal. He is in respiratory distress. He has no decreased breath sounds. He has no wheezes. He has no rhonchi. He has no rales.   Abdominal: Normal appearance and bowel sounds are normal.   Neurological: He is alert. He exhibits normal muscle tone. Coordination normal.   Skin: Skin is warm, dry, intact, not diaphoretic and not pale. Psychiatric: His speech is normal and behavior is normal. Mood, judgment and thought content normal.   Nursing note and vitals reviewed.        Assessment:       1. Hypoxia    2. Shortness of breath    3. Cough        Plan:         Hypoxia  -     SARS Coronavirus 2 Antigen, POCT    Shortness of breath    Cough    Other orders  -     albuterol nebulizer solution 0.63 mg

## 2020-12-31 NOTE — PATIENT INSTRUCTIONS

## 2021-01-12 LAB
COPY(IES) SENT TO:: NORMAL
PSA SERPL-MCNC: 1.4 NG/ML

## 2021-08-03 ENCOUNTER — OFFICE VISIT (OUTPATIENT)
Dept: PODIATRY | Facility: CLINIC | Age: 66
End: 2021-08-03
Payer: COMMERCIAL

## 2021-08-03 ENCOUNTER — HOSPITAL ENCOUNTER (OUTPATIENT)
Dept: RADIOLOGY | Facility: CLINIC | Age: 66
Discharge: HOME | End: 2021-08-03
Attending: PODIATRIST
Payer: COMMERCIAL

## 2021-08-03 VITALS
BODY MASS INDEX: 41.2 KG/M2 | WEIGHT: 279 LBS | HEART RATE: 78 BPM | DIASTOLIC BLOOD PRESSURE: 79 MMHG | SYSTOLIC BLOOD PRESSURE: 138 MMHG

## 2021-08-03 DIAGNOSIS — M79.671 RIGHT FOOT PAIN: Primary | ICD-10-CM

## 2021-08-03 DIAGNOSIS — M11.271: ICD-10-CM

## 2021-08-03 DIAGNOSIS — M10.071 ACUTE IDIOPATHIC GOUT OF RIGHT FOOT: ICD-10-CM

## 2021-08-03 PROCEDURE — 73630 X-RAY EXAM OF FOOT: CPT | Mod: RT,S$GLB,, | Performed by: RADIOLOGY

## 2021-08-03 PROCEDURE — 99204 PR OFFICE/OUTPT VISIT, NEW, LEVL IV, 45-59 MIN: ICD-10-PCS | Mod: S$GLB,,, | Performed by: PODIATRIST

## 2021-08-03 PROCEDURE — 73630 XR FOOT COMPLETE 3 VIEW RIGHT: ICD-10-PCS | Mod: RT,S$GLB,, | Performed by: RADIOLOGY

## 2021-08-03 PROCEDURE — 99204 OFFICE O/P NEW MOD 45 MIN: CPT | Mod: S$GLB,,, | Performed by: PODIATRIST

## 2021-08-03 RX ORDER — CANAGLIFLOZIN 100 MG/1
100 TABLET, FILM COATED ORAL DAILY
COMMUNITY
Start: 2021-07-14 | End: 2023-05-16 | Stop reason: ALTCHOICE

## 2021-08-03 RX ORDER — HYDROCODONE BITARTRATE AND ACETAMINOPHEN 5; 325 MG/1; MG/1
1 TABLET ORAL EVERY 6 HOURS PRN
COMMUNITY
Start: 2021-07-06 | End: 2023-05-16 | Stop reason: ALTCHOICE

## 2021-08-03 RX ORDER — METFORMIN HYDROCHLORIDE 500 MG/1
1000 TABLET ORAL 2 TIMES DAILY
COMMUNITY
Start: 2021-07-14 | End: 2023-05-16 | Stop reason: ALTCHOICE

## 2021-08-03 RX ORDER — CEPHALEXIN 500 MG/1
500 CAPSULE ORAL EVERY 6 HOURS
COMMUNITY
Start: 2021-07-20 | End: 2023-05-16 | Stop reason: ALTCHOICE

## 2021-08-03 RX ORDER — FERROUS GLUCONATE 324(38)MG
TABLET ORAL
COMMUNITY
End: 2023-05-16 | Stop reason: ALTCHOICE

## 2021-08-03 RX ORDER — ORAL SEMAGLUTIDE 14 MG/1
14 TABLET ORAL DAILY
COMMUNITY
Start: 2021-06-30

## 2021-08-03 RX ORDER — LISINOPRIL AND HYDROCHLOROTHIAZIDE 12.5; 2 MG/1; MG/1
1 TABLET ORAL DAILY
COMMUNITY
Start: 2021-04-20 | End: 2023-05-16 | Stop reason: ALTCHOICE

## 2021-08-03 RX ORDER — NAPROXEN 500 MG/1
500 TABLET ORAL 2 TIMES DAILY
Qty: 60 TABLET | Refills: 2 | Status: SHIPPED | OUTPATIENT
Start: 2021-08-03 | End: 2021-09-02

## 2021-08-03 RX ORDER — GUAIFENESIN/DEXTROMETHORPHAN 100-10MG/5
5 SYRUP ORAL
COMMUNITY
Start: 2021-01-05 | End: 2023-05-16 | Stop reason: ALTCHOICE

## 2021-08-03 RX ORDER — COLCHICINE 0.6 MG/1
TABLET ORAL
COMMUNITY
Start: 2021-07-06 | End: 2023-05-16 | Stop reason: ALTCHOICE

## 2021-09-29 ENCOUNTER — TELEPHONE (OUTPATIENT)
Dept: UROLOGY | Facility: CLINIC | Age: 66
End: 2021-09-29

## 2021-11-23 ENCOUNTER — TELEPHONE (OUTPATIENT)
Dept: UROLOGY | Facility: CLINIC | Age: 66
End: 2021-11-23
Payer: COMMERCIAL

## 2022-01-11 ENCOUNTER — OFFICE VISIT (OUTPATIENT)
Dept: UROLOGY | Facility: CLINIC | Age: 67
End: 2022-01-11
Payer: COMMERCIAL

## 2022-01-11 VITALS
HEIGHT: 69 IN | WEIGHT: 279.13 LBS | DIASTOLIC BLOOD PRESSURE: 87 MMHG | HEART RATE: 82 BPM | SYSTOLIC BLOOD PRESSURE: 156 MMHG | BODY MASS INDEX: 41.34 KG/M2

## 2022-01-11 DIAGNOSIS — N13.8 BPH WITH OBSTRUCTION/LOWER URINARY TRACT SYMPTOMS: Primary | ICD-10-CM

## 2022-01-11 DIAGNOSIS — Z12.5 PROSTATE CANCER SCREENING: ICD-10-CM

## 2022-01-11 DIAGNOSIS — E66.01 MORBID OBESITY: ICD-10-CM

## 2022-01-11 DIAGNOSIS — N40.1 BPH WITH OBSTRUCTION/LOWER URINARY TRACT SYMPTOMS: Primary | ICD-10-CM

## 2022-01-11 DIAGNOSIS — R97.20 INCREASED PROSTATE SPECIFIC ANTIGEN (PSA) VELOCITY: ICD-10-CM

## 2022-01-11 PROCEDURE — 1160F PR REVIEW ALL MEDS BY PRESCRIBER/CLIN PHARMACIST DOCUMENTED: ICD-10-PCS | Mod: CPTII,S$GLB,, | Performed by: UROLOGY

## 2022-01-11 PROCEDURE — 81002 URINALYSIS NONAUTO W/O SCOPE: CPT | Mod: S$GLB,,, | Performed by: UROLOGY

## 2022-01-11 PROCEDURE — 1159F PR MEDICATION LIST DOCUMENTED IN MEDICAL RECORD: ICD-10-PCS | Mod: CPTII,S$GLB,, | Performed by: UROLOGY

## 2022-01-11 PROCEDURE — 99999 PR PBB SHADOW E&M-EST. PATIENT-LVL V: ICD-10-PCS | Mod: PBBFAC,,, | Performed by: UROLOGY

## 2022-01-11 PROCEDURE — 1126F AMNT PAIN NOTED NONE PRSNT: CPT | Mod: CPTII,S$GLB,, | Performed by: UROLOGY

## 2022-01-11 PROCEDURE — 1159F MED LIST DOCD IN RCRD: CPT | Mod: CPTII,S$GLB,, | Performed by: UROLOGY

## 2022-01-11 PROCEDURE — 99999 PR PBB SHADOW E&M-EST. PATIENT-LVL V: CPT | Mod: PBBFAC,,, | Performed by: UROLOGY

## 2022-01-11 PROCEDURE — 1101F PR PT FALLS ASSESS DOC 0-1 FALLS W/OUT INJ PAST YR: ICD-10-PCS | Mod: CPTII,S$GLB,, | Performed by: UROLOGY

## 2022-01-11 PROCEDURE — 1160F RVW MEDS BY RX/DR IN RCRD: CPT | Mod: CPTII,S$GLB,, | Performed by: UROLOGY

## 2022-01-11 PROCEDURE — 3077F SYST BP >= 140 MM HG: CPT | Mod: CPTII,S$GLB,, | Performed by: UROLOGY

## 2022-01-11 PROCEDURE — 99215 PR OFFICE/OUTPT VISIT, EST, LEVL V, 40-54 MIN: ICD-10-PCS | Mod: S$GLB,,, | Performed by: UROLOGY

## 2022-01-11 PROCEDURE — 3079F DIAST BP 80-89 MM HG: CPT | Mod: CPTII,S$GLB,, | Performed by: UROLOGY

## 2022-01-11 PROCEDURE — 3077F PR MOST RECENT SYSTOLIC BLOOD PRESSURE >= 140 MM HG: ICD-10-PCS | Mod: CPTII,S$GLB,, | Performed by: UROLOGY

## 2022-01-11 PROCEDURE — 3288F PR FALLS RISK ASSESSMENT DOCUMENTED: ICD-10-PCS | Mod: CPTII,S$GLB,, | Performed by: UROLOGY

## 2022-01-11 PROCEDURE — 3008F BODY MASS INDEX DOCD: CPT | Mod: CPTII,S$GLB,, | Performed by: UROLOGY

## 2022-01-11 PROCEDURE — 99215 OFFICE O/P EST HI 40 MIN: CPT | Mod: S$GLB,,, | Performed by: UROLOGY

## 2022-01-11 PROCEDURE — 1101F PT FALLS ASSESS-DOCD LE1/YR: CPT | Mod: CPTII,S$GLB,, | Performed by: UROLOGY

## 2022-01-11 PROCEDURE — 3079F PR MOST RECENT DIASTOLIC BLOOD PRESSURE 80-89 MM HG: ICD-10-PCS | Mod: CPTII,S$GLB,, | Performed by: UROLOGY

## 2022-01-11 PROCEDURE — 3288F FALL RISK ASSESSMENT DOCD: CPT | Mod: CPTII,S$GLB,, | Performed by: UROLOGY

## 2022-01-11 PROCEDURE — 1126F PR PAIN SEVERITY QUANTIFIED, NO PAIN PRESENT: ICD-10-PCS | Mod: CPTII,S$GLB,, | Performed by: UROLOGY

## 2022-01-11 PROCEDURE — 81002 POCT URINE DIPSTICK WITHOUT MICROSCOPE: ICD-10-PCS | Mod: S$GLB,,, | Performed by: UROLOGY

## 2022-01-11 PROCEDURE — 3008F PR BODY MASS INDEX (BMI) DOCUMENTED: ICD-10-PCS | Mod: CPTII,S$GLB,, | Performed by: UROLOGY

## 2022-01-11 NOTE — LETTER
January 17, 2022        Edvin Williamson MD  [address] [facility]  [address]  [place] [address]             [facility]  [address] [place] [address] VLADIMIR CAMACHO [address] [place] [id number]  Phone: [phone]  Fax: [phone]   Patient: Louie Estrella   MR Number: [id number]   YOB: 1955   Date of Visit: 1/11/2022       Dear Dr. Williamson:    Thank you for referring Louie Estrella to me for evaluation. Attached you will find relevant portions of my assessment and plan of care.    If you have questions, please do not hesitate to call me. I look forward to following Louie Estrella along with you.    Sincerely,      Justin De Oliveira MD            CC  No Recipients    Enclosure

## 2022-01-11 NOTE — PROGRESS NOTES
[facility] Urology Progress Note    Louie Estrella is a 66 y.o. male who presents for bph follow up, transitioning care from Dr Duke, at referral of Dr Williamson    Last seen by Dr. Williamson 9/24/21 noting that his BPH previously treated by Dr. Duke yielded no trouble after surgery with urinating with some days he has more frequency and needs a new urologist for annual.  Also ordered an echo as it has been 3 years since his last echo with a history of LVH.  Outside PSA reviewed from [facility] on 1/11/21 of 1.4, and 10/25/19 of 1.5    He previously underwent laser TURP in 2014 and last saw Dr Duke 12/2020 noting adequate flow and emptying with improvement since procedure though remained on finsateride. PSA 1.5 in 2019 and 1.4 on 1/11/21.  Initial BPH workup in 2014 found 6cm prostatic urethra with trilobar obstruction on cystoscopy with a volume of 83.4 g on transrectal ultrasound.  His PSA at that time was 4.4 and he did have a postvoid residual of 440 cc.  No prostate biopsy or further workup of his elevated PSA was performed given the noted significant prostate volume and BPH with obstruction, which his PSA was attributed to.    He returns today noting that he has been voiding relatively well since his laser TURP, though admits today that he is not quite as good as right after the procedure.    He has had mild symptomatic recurrence over the years and has remained on finasteride.  He has however been off his finasteride for a while now, and last refill was obtained on 11/24/21 for which he completed the bottle and did not renew yet.  Review of PSA notes it has been in the 2.8-3 range, when adjusting for finasteride effects given his PSAs of 1.4-2.5 above.  He has no family history of prostate cancer.  He does drink mostly coffee and water throughout the day.  He drinks about 6 cups of coffee throughout the day.  Prior to TURP he was on Flomax and finasteride. DCed flomax after procedure  AUA SS:  7-8/2 (4:  Frequency;  "2:  Urgency; 1-2:  Sleeping)  Frequency is about every 2-3 hours.  Mild urgency but no urge incontinence.  He is a diabetic and is medicated for this.      Focused Physical Exam:    Vitals:    01/11/22 1119   BP: (!) 156/87   Pulse: 82     Body mass index is 41.22 kg/m². Weight: 126.6 kg (279 lb 1.6 oz) Height: 5' 9" (175.3 cm)     Abdomen: Soft, non-tender, nondistended, no CVA tenderness  :  normal phallus without plaques/lesions, orthotopic urethral meatus, bilaterally desc testes in normal scrotum without mass or lesion  SIM: normal sphincter tone, no masses, no hemmorrhoids   PROSTATE: 40g, no nodules, non-tender, symmetrical.       Recent Results (from the past 336 hour(s))   POCT URINE DIPSTICK WITHOUT MICROSCOPE    Collection Time: 01/11/22 11:34 AM   Result Value Ref Range    Color, UA Yellow     pH, UA 5.5     WBC, UA neg     Nitrite, UA neg     Protein, UA neg     Glucose,  mg     Ketones, UA neg     Urobilinogen, UA 0.2     Bilirubin, UA neg     Blood, UA neg     Clarity, UA Clear     Spec Grav UA 1.025        ASSESSMENT   1. BPH with obstruction/lower urinary tract symptoms  POCT URINE DIPSTICK WITHOUT MICROSCOPE   2. Prostate cancer screening  PSA, Screening    PSA, Total and Free   3. Increased prostate specific antigen (PSA) velocity     4. Morbid obesity         Plan    Extensive review of prior urologic record as summarized above, and outside notes and labs from primary care, all including prior prostate procedures in diagnostics and PSA history and treatment plans.  We did review his long history of BPH with obstruction, which he was found have significant prostatomegaly with trilobar obstruction ultimately underwent laser TURP.  We did review that laser TURP has potential for shorter interval for recurrence of obstruction and LUTS then traditional TURP, but he is doing very well.  He has no significant obstructive voiding symptoms and does have some bothersome frequency and we discussed " conservative recommendations for urgency and frequency.  Namely his significant caffeine and coffee intake would be 1 area to focus on at this time.  He is not in need of any alpha blockers or obstructive medications.  He has continued finasteride since his procedure.  We did discuss the value of finasteride in gland shrinkage in working in combination with alpha-blocker, however as primary monotherapy has little value and little yield.  He has been off of it for a little while, and advised him to discontinue it and not refill.  As well, we did discuss his previous PSA elevations prior to management of his BPH, most likely secondary to BPH, but never evaluated for underlying malignancy.  We discussed routine prostate cancer screening recommendations and also the need to interpret PSA in the context of finasteride use and therefore all of his recent values need to be doubled for interpretation.  Even when adjusting for finasteride effect, there still normal PSAs but would want to follow this closely going forward.  Will await discontinuation of finasteride for a few more months and then about 3 months check a free and total PSA to help establish a new baseline total PSA and make sure there are no concerns from a free PSA standpoint.  Will then follow PSA annually from this new baseline.  Will chart check PSA in 3 months otherwise RTC 1 year or prn worsening LUTS in interim.    Total time spent in/on encounter today, including face to face time with patient, counseling, medical record review, interpretation of tests/results, , and treatment plan coordination: 40 minutes

## 2022-04-11 ENCOUNTER — LAB VISIT (OUTPATIENT)
Dept: LAB | Facility: HOSPITAL | Age: 67
End: 2022-04-11
Attending: UROLOGY
Payer: COMMERCIAL

## 2022-04-11 DIAGNOSIS — Z12.5 PROSTATE CANCER SCREENING: ICD-10-CM

## 2022-04-11 PROCEDURE — 36415 COLL VENOUS BLD VENIPUNCTURE: CPT | Performed by: UROLOGY

## 2022-04-11 PROCEDURE — 84154 ASSAY OF PSA FREE: CPT | Performed by: UROLOGY

## 2022-04-11 PROCEDURE — 84153 ASSAY OF PSA TOTAL: CPT | Performed by: UROLOGY

## 2022-04-12 LAB
PROSTATE SPECIFIC ANTIGEN, TOTAL: 4.8 NG/ML (ref 0–4)
PSA FREE MFR SERPL: 32.08 %
PSA FREE SERPL-MCNC: 1.54 NG/ML (ref 0–1.5)

## 2022-04-17 DIAGNOSIS — R97.20 ELEVATED PSA: Primary | ICD-10-CM

## 2022-04-18 NOTE — PROGRESS NOTES
Psa still true elevation in absence of finasteride, though free psa normal >30%   Would recommend MRI prostate for further evaluation. If no significant index lesions, will continue to follow free/total psa closely. If MRI+ will discuss further eval  Schedule MRI with stat Cr on arrival next per availability

## 2022-05-02 ENCOUNTER — HOSPITAL ENCOUNTER (OUTPATIENT)
Dept: RADIOLOGY | Facility: HOSPITAL | Age: 67
Discharge: HOME | End: 2022-05-02
Attending: UROLOGY
Payer: COMMERCIAL

## 2022-05-02 DIAGNOSIS — R97.20 ELEVATED PSA: ICD-10-CM

## 2022-05-02 LAB
CREAT SERPL-MCNC: 1.1 MG/DL (ref 0.5–1.4)
SAMPLE: NORMAL

## 2022-05-02 PROCEDURE — 72197 MRI PELVIS W/O & W/DYE: CPT | Mod: 26,,, | Performed by: RADIOLOGY

## 2022-05-02 PROCEDURE — 72197 MRI PELVIS W/O & W/DYE: CPT | Mod: TC

## 2022-05-02 PROCEDURE — 72197 MRI PROSTATE W W/O CONTRAST: ICD-10-PCS | Mod: 26,,, | Performed by: RADIOLOGY

## 2022-05-02 RX ORDER — GADOBUTROL 604.72 MG/ML
10 INJECTION INTRAVENOUS
Status: DISCONTINUED | OUTPATIENT
Start: 2022-05-02 | End: 2022-05-03 | Stop reason: HOSPADM

## 2022-05-05 DIAGNOSIS — R97.20 ELEVATED PSA: Primary | ICD-10-CM

## 2022-05-06 NOTE — PROGRESS NOTES
Mri without index lesion, and prostate significantly enlarged at 83 grams which supports psa value and his normal free psa.  As psa may be rising to new baseline off finasteride, set free/total psa at same lab in 6 mos, and 1 yr reminder with additional prior

## 2022-11-08 ENCOUNTER — LAB VISIT (OUTPATIENT)
Dept: LAB | Facility: HOSPITAL | Age: 67
End: 2022-11-08
Attending: UROLOGY
Payer: COMMERCIAL

## 2022-11-08 DIAGNOSIS — R97.20 ELEVATED PSA: ICD-10-CM

## 2022-11-08 LAB
PROSTATE SPECIFIC ANTIGEN, TOTAL: 5.3 NG/ML (ref 0–4)
PSA FREE MFR SERPL: 34.72 %
PSA FREE SERPL-MCNC: 1.84 NG/ML (ref 0–1.5)

## 2022-11-08 PROCEDURE — 84153 ASSAY OF PSA TOTAL: CPT | Performed by: UROLOGY

## 2022-11-08 PROCEDURE — 36415 COLL VENOUS BLD VENIPUNCTURE: CPT | Performed by: UROLOGY

## 2022-11-08 PROCEDURE — 84154 ASSAY OF PSA FREE: CPT | Performed by: UROLOGY

## 2023-03-22 ENCOUNTER — TELEPHONE (OUTPATIENT)
Dept: UROLOGY | Facility: CLINIC | Age: 68
End: 2023-03-22
Payer: COMMERCIAL

## 2023-03-22 NOTE — TELEPHONE ENCOUNTER
Spoke w pt scheduled with Dr De Oliveira on 5/15with lab prior.  Informed need for reschedule of appt offered 5/16  pt accepted appt rescheduled

## 2023-05-03 ENCOUNTER — LAB VISIT (OUTPATIENT)
Dept: LAB | Facility: HOSPITAL | Age: 68
End: 2023-05-03
Attending: UROLOGY
Payer: COMMERCIAL

## 2023-05-03 DIAGNOSIS — R97.20 ELEVATED PSA: ICD-10-CM

## 2023-05-03 LAB
PROSTATE SPECIFIC ANTIGEN, TOTAL: 5.8 NG/ML (ref 0–4)
PSA FREE MFR SERPL: 29.83 %
PSA FREE SERPL-MCNC: 1.73 NG/ML (ref 0–1.5)

## 2023-05-03 PROCEDURE — 36415 COLL VENOUS BLD VENIPUNCTURE: CPT | Performed by: UROLOGY

## 2023-05-03 PROCEDURE — 84153 ASSAY OF PSA TOTAL: CPT | Performed by: UROLOGY

## 2023-05-16 ENCOUNTER — OFFICE VISIT (OUTPATIENT)
Dept: UROLOGY | Facility: CLINIC | Age: 68
End: 2023-05-16
Payer: COMMERCIAL

## 2023-05-16 VITALS
HEART RATE: 84 BPM | DIASTOLIC BLOOD PRESSURE: 85 MMHG | WEIGHT: 293.19 LBS | SYSTOLIC BLOOD PRESSURE: 154 MMHG | HEIGHT: 69 IN | BODY MASS INDEX: 43.43 KG/M2

## 2023-05-16 DIAGNOSIS — R33.9 INCOMPLETE BLADDER EMPTYING: ICD-10-CM

## 2023-05-16 DIAGNOSIS — R97.20 BPH WITH ELEVATED PSA: Primary | ICD-10-CM

## 2023-05-16 DIAGNOSIS — N40.0 BPH WITH ELEVATED PSA: Primary | ICD-10-CM

## 2023-05-16 LAB
BILIRUBIN, UA POC OHS: NEGATIVE
BLOOD, UA POC OHS: NEGATIVE
CLARITY, UA POC OHS: CLEAR
COLOR, UA POC OHS: YELLOW
GLUCOSE, UA POC OHS: >=1000
KETONES, UA POC OHS: NEGATIVE
LEUKOCYTES, UA POC OHS: NEGATIVE
NITRITE, UA POC OHS: NEGATIVE
PH, UA POC OHS: 6
POC RESIDUAL URINE VOLUME: 285 ML (ref 0–100)
POC RESIDUAL URINE VOLUME: 70 ML (ref 0–100)
PROTEIN, UA POC OHS: NEGATIVE
SPECIFIC GRAVITY, UA POC OHS: 1.02
UROBILINOGEN, UA POC OHS: 0.2

## 2023-05-16 PROCEDURE — 3077F PR MOST RECENT SYSTOLIC BLOOD PRESSURE >= 140 MM HG: ICD-10-PCS | Mod: CPTII,S$GLB,, | Performed by: UROLOGY

## 2023-05-16 PROCEDURE — 3079F DIAST BP 80-89 MM HG: CPT | Mod: CPTII,S$GLB,, | Performed by: UROLOGY

## 2023-05-16 PROCEDURE — 1101F PR PT FALLS ASSESS DOC 0-1 FALLS W/OUT INJ PAST YR: ICD-10-PCS | Mod: CPTII,S$GLB,, | Performed by: UROLOGY

## 2023-05-16 PROCEDURE — 3079F PR MOST RECENT DIASTOLIC BLOOD PRESSURE 80-89 MM HG: ICD-10-PCS | Mod: CPTII,S$GLB,, | Performed by: UROLOGY

## 2023-05-16 PROCEDURE — 99999 PR PBB SHADOW E&M-EST. PATIENT-LVL III: ICD-10-PCS | Mod: PBBFAC,,, | Performed by: UROLOGY

## 2023-05-16 PROCEDURE — 1101F PT FALLS ASSESS-DOCD LE1/YR: CPT | Mod: CPTII,S$GLB,, | Performed by: UROLOGY

## 2023-05-16 PROCEDURE — 4010F ACE/ARB THERAPY RXD/TAKEN: CPT | Mod: CPTII,S$GLB,, | Performed by: UROLOGY

## 2023-05-16 PROCEDURE — 1159F PR MEDICATION LIST DOCUMENTED IN MEDICAL RECORD: ICD-10-PCS | Mod: CPTII,S$GLB,, | Performed by: UROLOGY

## 2023-05-16 PROCEDURE — 4010F PR ACE/ARB THEARPY RXD/TAKEN: ICD-10-PCS | Mod: CPTII,S$GLB,, | Performed by: UROLOGY

## 2023-05-16 PROCEDURE — 99999 PR PBB SHADOW E&M-EST. PATIENT-LVL III: CPT | Mod: PBBFAC,,, | Performed by: UROLOGY

## 2023-05-16 PROCEDURE — 99214 OFFICE O/P EST MOD 30 MIN: CPT | Mod: S$GLB,,, | Performed by: UROLOGY

## 2023-05-16 PROCEDURE — 1160F RVW MEDS BY RX/DR IN RCRD: CPT | Mod: CPTII,S$GLB,, | Performed by: UROLOGY

## 2023-05-16 PROCEDURE — 81003 POCT URINALYSIS(INSTRUMENT): ICD-10-PCS | Mod: QW,S$GLB,, | Performed by: UROLOGY

## 2023-05-16 PROCEDURE — 3077F SYST BP >= 140 MM HG: CPT | Mod: CPTII,S$GLB,, | Performed by: UROLOGY

## 2023-05-16 PROCEDURE — 3008F BODY MASS INDEX DOCD: CPT | Mod: CPTII,S$GLB,, | Performed by: UROLOGY

## 2023-05-16 PROCEDURE — 1160F PR REVIEW ALL MEDS BY PRESCRIBER/CLIN PHARMACIST DOCUMENTED: ICD-10-PCS | Mod: CPTII,S$GLB,, | Performed by: UROLOGY

## 2023-05-16 PROCEDURE — 1126F PR PAIN SEVERITY QUANTIFIED, NO PAIN PRESENT: ICD-10-PCS | Mod: CPTII,S$GLB,, | Performed by: UROLOGY

## 2023-05-16 PROCEDURE — 99214 PR OFFICE/OUTPT VISIT, EST, LEVL IV, 30-39 MIN: ICD-10-PCS | Mod: S$GLB,,, | Performed by: UROLOGY

## 2023-05-16 PROCEDURE — 1126F AMNT PAIN NOTED NONE PRSNT: CPT | Mod: CPTII,S$GLB,, | Performed by: UROLOGY

## 2023-05-16 PROCEDURE — 51798 POCT BLADDER SCAN: ICD-10-PCS | Mod: S$GLB,,, | Performed by: UROLOGY

## 2023-05-16 PROCEDURE — 1159F MED LIST DOCD IN RCRD: CPT | Mod: CPTII,S$GLB,, | Performed by: UROLOGY

## 2023-05-16 PROCEDURE — 81003 URINALYSIS AUTO W/O SCOPE: CPT | Mod: QW,S$GLB,, | Performed by: UROLOGY

## 2023-05-16 PROCEDURE — 3008F PR BODY MASS INDEX (BMI) DOCUMENTED: ICD-10-PCS | Mod: CPTII,S$GLB,, | Performed by: UROLOGY

## 2023-05-16 PROCEDURE — 3288F FALL RISK ASSESSMENT DOCD: CPT | Mod: CPTII,S$GLB,, | Performed by: UROLOGY

## 2023-05-16 PROCEDURE — 3288F PR FALLS RISK ASSESSMENT DOCUMENTED: ICD-10-PCS | Mod: CPTII,S$GLB,, | Performed by: UROLOGY

## 2023-05-16 PROCEDURE — 51798 US URINE CAPACITY MEASURE: CPT | Mod: S$GLB,,, | Performed by: UROLOGY

## 2023-05-16 NOTE — PROGRESS NOTES
[facility] Urology Progress Note     Louie Estrella is a 68 y.o. male who presents for f/u of BPH with elevated psa     Last seen by Dr. Williamson 9/24/21 noting that his BPH previously treated by Dr. Duke yielded no trouble after surgery with urinating with some days he has more frequency and needs a new urologist for annual.  Also ordered an echo as it has been 3 years since his last echo with a history of LVH.  Outside PSA reviewed from [facility] on 1/11/21 of 1.4, and 10/25/19 of 1.5     He previously underwent laser TURP in 2014 and last saw Dr Duke 12/2020 noting adequate flow and emptying with improvement since procedure though remained on finsateride. PSA 1.5 in 2019 and 1.4 on 1/11/21.  Initial BPH workup in 2014 found 6cm prostatic urethra with trilobar obstruction on cystoscopy with a volume of 83.4 g on transrectal ultrasound.  His PSA at that time was 4.4 and he did have a postvoid residual of 440 cc.  No prostate biopsy or further workup of his elevated PSA was performed given the noted significant prostate volume and BPH with obstruction, which his PSA was attributed to.     1/2022: voiding relatively well since his laser TURP, though admits today that he is not quite as good as right after the procedure. He has had mild symptomatic recurrence over the years and has remained on finasteride. He has however been off his finasteride for a while now, and last refill was obtained on 11/24/21 for which he completed the bottle and did not renew yet. Review of PSA notes it has been in the 2.8-3 range, when adjusting for finasteride effects given his PSAs of 1.4-2.5 above.  He has no family history of prostate cancer. He does drink mostly coffee and water throughout the day.  He drinks about 6 cups of coffee throughout the day.  Prior to TURP he was on Flomax and finasteride. DCed flomax after procedure. AUA SS:  7-8/2 (4:  Frequency; 2:  Urgency; 1-2:  Sleeping)  Frequency is about every 2-3 hours.  Mild urgency  "but no urge incontinence.  He is a diabetic and is medicated for this.    Had rising psa velocity on finasteride so DCed it and est new baseline and continued to follow given elevation in interim   Reference Range  04/11/22 06:46 11/08/22 09:05 05/03/23 06:53   PSA Total 0.00 - 4.00  4.8 (H) 5.3 (H) 5.8 (H)   PSA, Free 0.00 - 1.50  1.54 (H) 1.84 (H) 1.73 (H)   PSA, Free % Not est 32.08 34.72 29.83   * From 1.4=2.8 in Nov 2021  MRI prostate 5/2022 was 83g PIRAD 2  AUA SS reported 2/0 (1 frequency, 1 sleeping)  Will go usually once at night, and night has discerned if he waits a few mins can go again. No issues in daytime.    DM - now on rebelsys. Prev invokana and metformin, off. 1000 gluc in urine. Last a1c around 6 he thinks. Longstanding DM. No smoking, no drinking. Food is his vice. Starting to work on portion control.  Stressful job, stress snacking - has replaced with nuts fruit.       Focused Physical Exam:    Vitals:    05/16/23 0850   BP: (!) 154/85   Pulse: 84     Body mass index is 43.3 kg/m². Weight: 133 kg (293 lb 3.4 oz) Height: 5' 9" (175.3 cm)     Abdomen: Soft, non-tender, nondistended, no CVA tenderness    Recent Results (from the past 336 hour(s))   PSA, Total and Free    Collection Time: 05/03/23  6:53 AM   Result Value Ref Range    PSA Total 5.8 (H) 0.00 - 4.00 ng/mL    PSA, Free 1.73 (H) 0.00 - 1.50 ng/mL    PSA, Free % 29.83 Not established %   POCT Bladder Scan    Collection Time: 05/16/23  8:57 AM   Result Value Ref Range    POC Residual Urine Volume 285 (A) 0 - 100 mL   POCT Urinalysis(Instrument)    Collection Time: 05/16/23  8:58 AM   Result Value Ref Range    Color, POC UA Yellow Yellow, Straw, Colorless    Clarity, POC UA Clear Clear    Glucose, POC UA >=1000 (A) Negative    Bilirubin, POC UA Negative Negative    Ketones, POC UA Negative Negative    Spec Grav POC UA 1.020 1.005 - 1.030    Blood, POC UA Negative Negative    pH, POC UA 6.0 5.0 - 8.0    Protein, POC UA Negative Negative    " Urobilinogen, POC UA 0.2 <=1.0    Nitrite, POC UA Negative Negative    WBC, POC UA Negative Negative   POCT Bladder Scan    Collection Time: 05/16/23  9:13 AM   Result Value Ref Range    POC Residual Urine Volume 70 0 - 100 mL       ASSESSMENT   1. BPH with elevated PSA  POCT Bladder Scan    POCT Urinalysis(Instrument)    POCT Bladder Scan    PSA, Total and Free      2. Incomplete bladder emptying  Uroflowmetry          Plan    Again extensively reviewed his history of BPH with incomplete emptying with laser TURP by Dr. Duke in 2014.  He did have PSA elevation prior to that without biopsy as a transrectal ultrasound was noted to be normal and PSA matches prostate size of 83 g at that time.  Residual was 440 cc at that time.  Since his TURP he is overall been voiding well and has had some urgency frequency, for which conservative recommendations have helped facilitate, as well as control of his diabetes.  He is a longstanding diabetic as well.  He is reported good urinary quality of life with mild recurrence of symptoms over time, and was on finasteride monotherapy without any symptomatic benefit or need so this was discontinued.  Shortly after discontinuing it, his PSA was again elevated likely from rebound from his finasteride discontinuance but free PSA has been normal greater than 30%.  PSA has been slightly trending upwards but within the normal range for his prostate size with normal PSA density as his prostate was again noted to be 83 g on MRI last year which was negative for any clinically significant index lesions.  Given the resection of prostate with finasteride for gland shrinkage and growth suppression, now all these years later with prostate size the same as preop, there is likely an element of reobstruction, however does not have significant obstructive voiding symptoms just urgency frequency.  However today after feeling empty, his bladder scan postvoid residual is 285 cc.  This remains elevated,  albeit is a bit better than preop 9 years ago, but when asked to void again, even though he had no sensation to void, he was able to bring his residual down to 70 cc by double voiding.  Has noticed some of this at night but during the daytime this has not been much of a problem, and there is possibility of reobstruction verses overall long-term changes of his bladder level from both chronic obstruction and longstanding diabetes.  We did have extensive risk benefit discussion both about his PSA elevation and rising never having had biopsy of the prostate, and no significant pathology due to laser TURP, despite normal free PSA and negative MRI, as well as despite minimal reported LUTS, these concerns with incomplete emptying mi recurrence.  After extensive discussion about management options including re-evaluation, and possible future cystoscopy and prostate biopsy to definitively rule out malignancy, at this time he would like to continue trending PSA free and total every 6 months, and at 6 months when he returns for labs, come for a nurse visit for a uroflow study to see if there is any objective obstruction on a noninvasive test, and recheck residual.  In the interim he will employed timed voiding every 2-3 hours, and double voiding during the day, AKA waiting at the end of voiding, and double voiding again as he did today in clinic, to decrease residual and facilitate bladder retraining.  Encouraged to continue good diabetic control, and will follow-up labs and results in 6 months to determine if any further lower tract workup or procedures are needed or if we will continue to follow.  Perhaps even start alpha-blocker depending on uroflow result.      Total time spent in/on encounter today, including face to face time with patient, counseling, medical record review, interpretation of tests/results, , and treatment plan coordination: 45 minutes

## 2024-03-06 NOTE — HIM RECORD RETIREMENT NOTE
correction of Incomplete Medical Record    3/6/24    Patient Name: Louie Estrella  Contact Serial # (CSN): [id number]  Patient Medical Record # (MRN): [id number]  Date of Service: Office Visit on 12/23/2020  Physician Name: Arvind Duke MD [[id number]     This record has been reviewed and is being retired as incomplete by the approval of the  Medical Executive Committee ([facility])     On 10/31/2023., due to:  Unavailability of Provider     Missing Information/Comments:  []    Discharge Summary   []    DC Note/Short Stay Summary   []    ED Provider Note   []    Delivery Note   []    History & Physical   []   Operative Note   []     Procedure Note   []     Physician Order   [x]     Verbal Order   []       Other, specify: